# Patient Record
Sex: MALE | Race: WHITE | NOT HISPANIC OR LATINO | Employment: STUDENT | URBAN - METROPOLITAN AREA
[De-identification: names, ages, dates, MRNs, and addresses within clinical notes are randomized per-mention and may not be internally consistent; named-entity substitution may affect disease eponyms.]

---

## 2017-01-12 ENCOUNTER — TELEPHONE (OUTPATIENT)
Dept: NEUROLOGY | Facility: CLINIC | Age: 27
End: 2017-01-12

## 2017-01-12 ENCOUNTER — PATIENT MESSAGE (OUTPATIENT)
Dept: NEUROLOGY | Facility: CLINIC | Age: 27
End: 2017-01-12

## 2017-02-05 ENCOUNTER — PATIENT MESSAGE (OUTPATIENT)
Dept: NEUROLOGY | Facility: CLINIC | Age: 27
End: 2017-02-05

## 2017-02-16 ENCOUNTER — PATIENT MESSAGE (OUTPATIENT)
Dept: NEUROLOGY | Facility: CLINIC | Age: 27
End: 2017-02-16

## 2017-04-03 ENCOUNTER — PATIENT MESSAGE (OUTPATIENT)
Dept: NEUROLOGY | Facility: CLINIC | Age: 27
End: 2017-04-03

## 2017-04-19 ENCOUNTER — PATIENT MESSAGE (OUTPATIENT)
Dept: NEUROLOGY | Facility: CLINIC | Age: 27
End: 2017-04-19

## 2017-07-31 DIAGNOSIS — G40.119 PARTIAL EPILEPSY WITH INTRACTABLE EPILEPSY: ICD-10-CM

## 2017-07-31 RX ORDER — LEVETIRACETAM 750 MG/1
2250 TABLET ORAL DAILY
Qty: 90 TABLET | Refills: 6 | Status: CANCELLED | OUTPATIENT
Start: 2017-07-31

## 2017-08-09 ENCOUNTER — TELEPHONE (OUTPATIENT)
Dept: NEUROLOGY | Facility: CLINIC | Age: 27
End: 2017-08-09

## 2018-03-07 ENCOUNTER — TELEPHONE (OUTPATIENT)
Dept: NEUROLOGY | Facility: CLINIC | Age: 28
End: 2018-03-07

## 2018-03-07 NOTE — TELEPHONE ENCOUNTER
Called in Keppra 750mg 1po qd, 2po qhs # 90 to [facility] pharmacy. Notified pt that he does need to make a follow up appointment with Dr Lee within the next month

## 2018-05-14 ENCOUNTER — PATIENT MESSAGE (OUTPATIENT)
Dept: NEUROLOGY | Facility: CLINIC | Age: 28
End: 2018-05-14

## 2018-05-21 ENCOUNTER — PATIENT MESSAGE (OUTPATIENT)
Dept: NEUROLOGY | Facility: CLINIC | Age: 28
End: 2018-05-21

## 2018-06-11 ENCOUNTER — OFFICE VISIT (OUTPATIENT)
Dept: NEUROLOGY | Facility: CLINIC | Age: 28
End: 2018-06-11
Payer: COMMERCIAL

## 2018-06-11 VITALS
SYSTOLIC BLOOD PRESSURE: 117 MMHG | DIASTOLIC BLOOD PRESSURE: 72 MMHG | WEIGHT: 143 LBS | BODY MASS INDEX: 20.47 KG/M2 | HEIGHT: 70 IN | HEART RATE: 65 BPM

## 2018-06-11 DIAGNOSIS — G40.219 LOCALIZATION-RELATED SYMPTOMATIC EPILEPSY AND EPILEPTIC SYNDROMES WITH COMPLEX PARTIAL SEIZURES, INTRACTABLE, WITHOUT STATUS EPILEPTICUS: ICD-10-CM

## 2018-06-11 DIAGNOSIS — G40.109 TEMPORAL LOBE EPILEPSY WITH MESIAL TEMPORAL SCLEROSIS: ICD-10-CM

## 2018-06-11 DIAGNOSIS — G40.309 GENERALIZED CONVULSIVE EPILEPSY: Primary | ICD-10-CM

## 2018-06-11 DIAGNOSIS — G93.81 TEMPORAL LOBE EPILEPSY WITH MESIAL TEMPORAL SCLEROSIS: ICD-10-CM

## 2018-06-11 DIAGNOSIS — G40.119 PARTIAL EPILEPSY WITH INTRACTABLE EPILEPSY: ICD-10-CM

## 2018-06-11 PROCEDURE — 99999 PR PBB SHADOW E&M-EST. PATIENT-LVL II: CPT | Mod: PBBFAC,,, | Performed by: PSYCHIATRY & NEUROLOGY

## 2018-06-11 PROCEDURE — 99214 OFFICE O/P EST MOD 30 MIN: CPT | Mod: S$GLB,,, | Performed by: PSYCHIATRY & NEUROLOGY

## 2018-06-11 PROCEDURE — 3008F BODY MASS INDEX DOCD: CPT | Mod: CPTII,S$GLB,, | Performed by: PSYCHIATRY & NEUROLOGY

## 2018-06-11 RX ORDER — LEVETIRACETAM 750 MG/1
1500 TABLET ORAL 2 TIMES DAILY
Qty: 120 TABLET | Refills: 6 | Status: SHIPPED | OUTPATIENT
Start: 2018-06-11 | End: 2019-05-23 | Stop reason: SDUPTHER

## 2018-06-11 NOTE — PROGRESS NOTES
Name: Marco Wen  MRN: [id number]   CSN: [id number]      Date: 06/11/2018    HISTORY OF PRESENT ILLNESS (HPI)  Interim History  2018/06/11  Patient is not doing well.  He is having seizure have increased in frequency up to every other day.  He does not maintain a calendar of the seizures.  He is not adherent to the meds as prescribed.  He was unable to obtain the aptiom.  He presently takes Lamictal one a day and levetiracetam 3 per day but may take additional particularly the levetiracetam.  Details of the history he provides varies at times/  He is not on a regular schedule of meds.      2016/12/20   At the last visit he was to take Lamictal 1000 mg and Keppra 750 mg daily.  He was continuing to have seizures so he increased the Keppra to 1500 mg per day and reduced Lamictal to 200 mg per day.  He has experienced no seizures now for the past month. Over the past two years he has been on essentially the same dose without control of his seizures.  Lamictal in levels in the teens with continued control.    2016-08-07   Pt returns to the clinic for follow up for complex partial seizure and evaluation for surgery. EMU evaluation was completed.  Seven sz were recorded originating from the right temporal area.  These were his typical seizure.  Levels have not been done recently and the last lamictal level was very low but the patient reports he was not taking it correctly.   When he was on a higher doses of Lamictal he noted tired sensation in his eyes and he would be dizzy at times.  No levels have been obtained when he has these symptoms.      2015-10-13  He up-titrated lamictal to 400 mg and wean off keppra. He went 3 months without seizures but did experience side effects.  He did not feel right.  Subsequently however the seizure returned and seizures stopped for a few weeks but have returned again.  Frequency is presently 1 per week. Pt is unable to quantify the total number of seizures.   Hence, pt is  "now taking Lamital 300 mg daily (200 mg AM, 100 mg PM) and Keppra 750 mg BID which he started about 10 days ago.       Pt also reports that his memory has been worsening. He explains that sometimes he walks into a room and will not remember the reason he went to that room in the first place. Pt wants to discuss whether he is going to be a surgical candidate and wants to pursue surgical intervention while he is under his father's insurance.   Epilepsy History   The patient is a 27 y.o. 20-year-old RHWM who has been having symptoms which he calls "episode things" which began about the age of 7 years. He used to have 7 to 8 per day. There has been no triggers identified. He does not experience loss of awareness and the symptoms have not limited his function. These episodes occur any where from months apart up to several in a day.   He has had a few times in which he passed out. Two occurred when he was working at [employer]. His peers made fun of him saying he had a convulsion. On one occasion he hit his head on the floor with the fall. One occurred in class which was preceded by him feeling the "episode thing". No further description of the event is available.   He had not been treated until Nov 2009 when Dr Box started him on Keppra  mg 2 tabs at bed time. Subsequently the symptoms have stopped except a couple of times when he had missed a dose of medication. He has no side effects to the Keppra. He a [employer] in college with better than a 3.0 grade point average.     Jeanne - Feels "something is not right" internally followed by metalic greasy taste  Non-convulsive - LOC, mutters, blank stare  conv - None  Duration - 1 min  Post Ictal   Symptoms - none    Seizure Triggers  Sleep Deprivation - None  Other medications - None  Psych/stress - None  Photic stimulation - None  Hyperventilation - None  Medical Problems - None  Menses - No  Sensory Stimulation (light, sound, etc) - None  Missed dose of meds - " "None    AED Treatments  Present regimen  Keppra  mg 1 tabs BID  lamotrigine (Lamictal, LTG)     Prior treatments  eslicarbazine (Aptiom, ESL) - could not obtain due to expense     Not tried  acetazolamide (Diamox, AZM)  amantadine  carbamazepine (Tegretol, CBZ)  clobazam (Onfi or Frizium, CLB)  ethosuximide (Zarontin, ESM)  felbamate (felbatol, FBM)  gabapentin (Neurontin, GPN)  lacosamide (Vimpat, LCS)   levetiracetam (Keppra, LEV)  methsuximide (Celontin, MSM)  methyphenytoin (Mesantion, MHT)  oxcarbazepine (Trileptal OXC)  perampanel (Fycompa, FCP)   phenobarbital (Pb)  phenytoin (Dilantin, PHT)  pregabalin (Lyrica, PGB)  primidone (Mysoline, PRM)  retigabine (Potiga, RTG)  rufinamide (Banzel, RUF)  tiagabine (Gabatril,  TGB)  topiramate (Topamax, TPM)  vigabatrin, (Sabril, VGB)  vagal nerve stimulator (VNS)  valproic acid (Depakote, VPA)  zonisamide (Zonegran, ZNA)   Benzodiazepines  diazepam - rectal (Diastatl)  diazepam - oral (Valium, DZ)  clonazepam (Klonopin, CZP)  clorazepate (Tranxene, CLZ)  Ativan  Brain Stimulation  Vagal Nerve Stimulation-n/a  DBS- n/a    Adherence/Compliance method  Memory - yes  Mom or Spouse - Yes  Pill Box - no  Lonnie calendar - no  Turn over medication bottle - no  Phone alarm - no    Seizure Evaluation  EEG -   [facility] Milan as "mildly abnormal EEG due to the presence of intermittent theta slowing over the right central-temporal head regions.   10/31/2007 - Normal  12/04/2009 - Normal  MRI   - 01/17/2011 - there is an abnormal intraaxial mass involving the left middle cerebellar peduncle and the left paramidline adjacent cerebellum with mass effect and enhancement. Diagnostic possibilities include astrocytoma, medulloblastoma, oligodendroglioma, ganglioglioma, plus other rare neuroepithelial neoplasms. Granulomatous disease, infection, and vascular malformation may be less likely. Lyme disease is less likely.  First was in 2003 showing " "a "lesion" and repeat scans show no change. The most recent was done just before Thanksgiving in 2009.   2015/09/30 - Asymmetric volume loss and increased signal within the right mesial temporal lobe concerning for mesial temporal sclerosis.   Stable intra-axial mass within the left cerebellar peduncle extending into the left cerebral hemisphere. Given stability since prior exam and reported stability dating back to 2003 this may represent a benign process such as old granulomatous disease or atypical vascular malformation. However a low grade tumor such as pilocytic astrocytoma cannot be excluded. Correlate with prior history recommended as prior treatment could alter the imaging appearance and differential diagnosis for this lesion.  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     FAMILY HISTORY: No family history on file.      SOCIAL HISTORY:   Social History     Social History    Marital status: Single     Spouse name: N/A " "   Number of children: N/A    Years of education: N/A     Occupational History    Not on file.     Social History Main Topics    Smoking status: Never Smoker    Smokeless tobacco: Never Used    Alcohol use No    Drug use: No    Sexual activity: No     Other Topics Concern    Not on file     Social History Narrative    No narrative on file        SUBSTANCE USE:  Social History     Social History Main Topics    Smoking status: Never Smoker    Smokeless tobacco: Never Used    Alcohol use No    Drug use: No    Sexual activity: No      Social History   Substance Use Topics    Smoking status: Never Smoker    Smokeless tobacco: Never Used    Alcohol use No        ALLERGIES: Patient has no known allergies.     /72   Pulse 65   Ht 5' 10" (1.778 m)   Wt 64.9 kg (143 lb)   BMI 20.52 kg/m²     Higher Cortical Function:    Oriented - intact to person, place and time and followed two step instruction correctly.   Memory - good recent and remote  Fund of knowledge was appropriate.   Language - Speech was fluent without evidence for an aphasia.  No agnosias, agraphesthesia, or astereognosis was present.  Extinction - not found with double simultaneous stimulation present in a proximal-distal or right-left distrubution.  Cranial Nerves II - XII:   EOMs were intact with normal smooth and no nystagmus.   PERRLA. D/C fundoscopic exam revealed sharp flat disc  Motor - facial movement was symmetrical and normal.   Sensory - Light touch and pin prick sensations were normal.   Palate moved well and was symmetrical with normal palatal and oral sensation.   Tongue movement was full  Motor: Bulk, tone and power was normal in the extremities.  Sensory: Light touch, pin prick, vibration and position senses were normal in all extremities.   Coordination: Rapid alternating movements and rapid finger tapping were normal. Finger to  nose - nl. Arm roll was symmetrical.   Gait: Station, gait and tandem walking were done " without difficulty and Romberg was negative.  Deep tendon reflexes: Biceps - 2+ sym; Triceps - 2+ sym; brachioradialis - 2+ sym; Knee - 2+  sym; Ankle - 2+ sym;  Tremor: resting, postural, intentional - none  Frontal Release signs: Glabellar - neg; Snout - neg; Root - neg; palmomental - neg; grasp - neg    IMPRESSION  1.  Complex partial seizures - medically refractory   2.  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     DISPOSITION:  1.  Patient appears to be a good surgical candidate but further evaluation would be necessary   2. Continue Keppra but start a consistent regimen of 750 mg 2 BID  3. Stop lamictal  4. Keppra level in 1 week  5. I will see if he is a candidate for the Aptiom clinical trial   6. Start maintaining a calendar of his seizures.

## 2018-08-09 ENCOUNTER — PATIENT MESSAGE (OUTPATIENT)
Dept: NEUROLOGY | Facility: CLINIC | Age: 28
End: 2018-08-09

## 2018-08-15 ENCOUNTER — TELEPHONE (OUTPATIENT)
Dept: NEUROLOGY | Facility: CLINIC | Age: 28
End: 2018-08-15

## 2018-08-15 ENCOUNTER — PATIENT MESSAGE (OUTPATIENT)
Dept: NEUROLOGY | Facility: CLINIC | Age: 28
End: 2018-08-15

## 2019-05-23 DIAGNOSIS — G40.119 PARTIAL EPILEPSY WITH INTRACTABLE EPILEPSY: ICD-10-CM

## 2019-06-09 RX ORDER — LEVETIRACETAM 750 MG/1
TABLET ORAL
Qty: 120 TABLET | Refills: 4 | Status: SHIPPED | OUTPATIENT
Start: 2019-06-09

## 2019-08-06 ENCOUNTER — CLINICAL SUPPORT (OUTPATIENT)
Dept: URGENT CARE | Facility: CLINIC | Age: 29
End: 2019-08-06

## 2019-08-06 DIAGNOSIS — Z11.1 PPD SCREENING TEST: Primary | ICD-10-CM

## 2019-08-06 PROCEDURE — 86580 POCT TB SKIN TEST: ICD-10-PCS | Mod: S$GLB,,, | Performed by: NURSE PRACTITIONER

## 2019-08-06 PROCEDURE — 86580 TB INTRADERMAL TEST: CPT | Mod: S$GLB,,, | Performed by: NURSE PRACTITIONER
